# Patient Record
Sex: MALE | Race: OTHER | Employment: OTHER | URBAN - METROPOLITAN AREA
[De-identification: names, ages, dates, MRNs, and addresses within clinical notes are randomized per-mention and may not be internally consistent; named-entity substitution may affect disease eponyms.]

---

## 2024-10-01 ENCOUNTER — HOSPITAL ENCOUNTER (EMERGENCY)
Age: 39
Discharge: HOME | End: 2024-10-01
Attending: EMERGENCY MEDICINE

## 2024-10-01 VITALS
RESPIRATION RATE: 16 BRPM | OXYGEN SATURATION: 99 % | DIASTOLIC BLOOD PRESSURE: 73 MMHG | WEIGHT: 190.4 LBS | TEMPERATURE: 97.9 F | HEART RATE: 61 BPM | BODY MASS INDEX: 26.65 KG/M2 | HEIGHT: 71 IN | SYSTOLIC BLOOD PRESSURE: 124 MMHG

## 2024-10-01 DIAGNOSIS — S01.81XA FACIAL LACERATION, INITIAL ENCOUNTER: Primary | ICD-10-CM

## 2024-10-01 PROCEDURE — 12013 RPR F/E/E/N/L/M 2.6-5.0 CM: CPT

## 2024-10-01 PROCEDURE — 99283 EMERGENCY DEPT VISIT LOW MDM: CPT

## 2024-10-01 RX ORDER — IBUPROFEN 600 MG/1
600 TABLET, FILM COATED ORAL EVERY 6 HOURS PRN
Qty: 40 TABLET | Refills: 0 | Status: SHIPPED | OUTPATIENT
Start: 2024-10-01

## 2024-10-01 ASSESSMENT — PAIN SCALES - GENERAL
PAINLEVEL_OUTOF10: 4
PAINLEVEL_OUTOF10: 3

## 2024-10-01 ASSESSMENT — PAIN DESCRIPTION - PAIN TYPE: TYPE: ACUTE PAIN

## 2024-10-01 ASSESSMENT — PAIN DESCRIPTION - LOCATION
LOCATION: FACE
LOCATION: NOSE

## 2024-10-01 ASSESSMENT — PAIN DESCRIPTION - DESCRIPTORS: DESCRIPTORS: ACHING

## 2024-10-01 ASSESSMENT — PAIN - FUNCTIONAL ASSESSMENT: PAIN_FUNCTIONAL_ASSESSMENT: 0-10

## 2024-10-01 ASSESSMENT — PAIN DESCRIPTION - ORIENTATION: ORIENTATION: MID

## 2024-10-01 NOTE — ED PROVIDER NOTES
EMERGENCY DEPARTMENT ENCOUNTER     [facility] EMERGENCY DEPARTMENT     Pt Name: Bull Lara   MRN: [id number]   Birthdate 1985   Date of evaluation: 10/1/2024   Provider: Iván Kennedy II, DO   PCP: No primary care provider on file.   Note Started: 12:29 PM EDT 10/1/24     CHIEF COMPLAINT     Chief Complaint   Patient presents with    Facial Laceration     30 min pta, was working out and doing arm curls, band snapped and struck face with metal part on band. No loc. 2cm lac noted between eyes and 1.5 nose lac with swelling. No other injury        HISTORY OF PRESENT ILLNESS:  History from : Patient   Limitations to history : None     Bull Lara is a 39 y.o. male who presents to the emergency department with facial injury.  Patient states he was working out with tension bands when 1 slipped and he was hit in the face with a caribiner/buckle.  No loss of consciousness noted.  Patient denies muscle weakness or paresthesias.  No epistaxis reported.  Patient denies blurry vision, nausea or vomiting, or muscle weakness/paresthesias.    Nursing Notes were all reviewed and agreed with or any disagreements were addressed in the HPI.     ROS: Positives and Pertinent negatives as per HPI.    PAST MEDICAL HISTORY     Past medical history:  has no past medical history on file.    Past surgical history:  has no past surgical history on file.      PHYSICAL EXAM:  ED Triage Vitals [10/01/24 1120]   BP Systolic BP Percentile Diastolic BP Percentile Temp Temp Source Pulse Respirations SpO2   137/82 -- -- 97.9 °F (36.6 °C) Oral 59 10 100 %      Height Weight - Scale         1.803 m (5' 11\") 86.4 kg (190 lb 6.4 oz)              Physical Exam   General: No acute distress.  Head: #/Atraumatic.  Face: 2 cm laceration over the medial aspect of the left brow.  Additionally there is a 1.5 cm laceration involving the left nasal bridge.  No nasal bridge crepitus noted.  No evidence of